# Patient Record
Sex: MALE | Race: BLACK OR AFRICAN AMERICAN | NOT HISPANIC OR LATINO | Employment: FULL TIME | URBAN - METROPOLITAN AREA
[De-identification: names, ages, dates, MRNs, and addresses within clinical notes are randomized per-mention and may not be internally consistent; named-entity substitution may affect disease eponyms.]

---

## 2017-09-15 ENCOUNTER — HOSPITAL ENCOUNTER (OUTPATIENT)
Dept: CARDIOLOGY | Facility: HOSPITAL | Age: 47
Discharge: HOME | End: 2017-09-15
Attending: INTERNAL MEDICINE
Payer: COMMERCIAL

## 2017-09-15 DIAGNOSIS — R07.9 CHEST PAIN: ICD-10-CM

## 2017-09-15 LAB
MITRAL VALVE MOBILITY: NORMAL
RETIRED EF AND QEF - SEE NOTES: 55 (ref 55–65)

## 2017-09-15 PROCEDURE — 93321 DOPPLER ECHO F-UP/LMTD STD: CPT | Mod: 26,,, | Performed by: INTERNAL MEDICINE

## 2017-09-15 PROCEDURE — 93351 STRESS TTE COMPLETE: CPT | Mod: 26,,, | Performed by: INTERNAL MEDICINE

## 2017-09-15 PROCEDURE — 93351 STRESS TTE COMPLETE: CPT | Mod: PO

## 2020-05-21 ENCOUNTER — HOSPITAL ENCOUNTER (OUTPATIENT)
Dept: RADIOLOGY | Facility: HOSPITAL | Age: 50
Discharge: HOME | End: 2020-05-21
Attending: INTERNAL MEDICINE
Payer: COMMERCIAL

## 2020-05-21 DIAGNOSIS — J18.9 PNEUMONIA: ICD-10-CM

## 2020-05-21 PROCEDURE — 71046 X-RAY EXAM CHEST 2 VIEWS: CPT | Mod: TC,FY,PO

## 2021-04-14 ENCOUNTER — HOSPITAL ENCOUNTER (EMERGENCY)
Facility: HOSPITAL | Age: 51
Discharge: HOME | End: 2021-04-14
Attending: EMERGENCY MEDICINE
Payer: MEDICAID

## 2021-04-14 VITALS
OXYGEN SATURATION: 96 % | WEIGHT: 180 LBS | TEMPERATURE: 98 F | RESPIRATION RATE: 18 BRPM | HEIGHT: 64 IN | HEART RATE: 66 BPM | DIASTOLIC BLOOD PRESSURE: 98 MMHG | BODY MASS INDEX: 30.73 KG/M2 | SYSTOLIC BLOOD PRESSURE: 146 MMHG

## 2021-04-14 DIAGNOSIS — M70.41 PREPATELLAR BURSITIS OF RIGHT KNEE: Primary | ICD-10-CM

## 2021-04-14 DIAGNOSIS — R60.9 SWELLING: ICD-10-CM

## 2021-04-14 DIAGNOSIS — I10 POORLY-CONTROLLED HYPERTENSION: ICD-10-CM

## 2021-04-14 DIAGNOSIS — L03.115 CELLULITIS OF RIGHT KNEE: ICD-10-CM

## 2021-04-14 PROCEDURE — 96372 THER/PROPH/DIAG INJ SC/IM: CPT | Mod: ER

## 2021-04-14 PROCEDURE — 99284 EMERGENCY DEPT VISIT MOD MDM: CPT | Mod: 25,ER

## 2021-04-14 PROCEDURE — 25000003 PHARM REV CODE 250: Mod: ER | Performed by: EMERGENCY MEDICINE

## 2021-04-14 PROCEDURE — 63600175 PHARM REV CODE 636 W HCPCS: Mod: ER | Performed by: EMERGENCY MEDICINE

## 2021-04-14 RX ORDER — DICLOFENAC SODIUM 25 MG/1
25 TABLET, DELAYED RELEASE ORAL 3 TIMES DAILY PRN
Qty: 9 TABLET | Refills: 0 | Status: SHIPPED | OUTPATIENT
Start: 2021-04-14 | End: 2021-04-17

## 2021-04-14 RX ORDER — CLINDAMYCIN HYDROCHLORIDE 150 MG/1
300 CAPSULE ORAL
Status: COMPLETED | OUTPATIENT
Start: 2021-04-14 | End: 2021-04-14

## 2021-04-14 RX ORDER — KETOROLAC TROMETHAMINE 30 MG/ML
60 INJECTION, SOLUTION INTRAMUSCULAR; INTRAVENOUS
Status: COMPLETED | OUTPATIENT
Start: 2021-04-14 | End: 2021-04-14

## 2021-04-14 RX ORDER — CLINDAMYCIN HYDROCHLORIDE 150 MG/1
300 CAPSULE ORAL 4 TIMES DAILY
Qty: 56 CAPSULE | Refills: 0 | Status: SHIPPED | OUTPATIENT
Start: 2021-04-14 | End: 2021-04-21

## 2021-04-14 RX ADMIN — KETOROLAC TROMETHAMINE 60 MG: 30 INJECTION, SOLUTION INTRAMUSCULAR at 06:04

## 2021-04-14 RX ADMIN — CLINDAMYCIN HYDROCHLORIDE 300 MG: 150 CAPSULE ORAL at 07:04

## 2021-05-08 ENCOUNTER — HOSPITAL ENCOUNTER (EMERGENCY)
Facility: HOSPITAL | Age: 51
Discharge: HOME | End: 2021-05-08
Attending: EMERGENCY MEDICINE
Payer: MEDICAID

## 2021-05-08 VITALS
WEIGHT: 186 LBS | RESPIRATION RATE: 18 BRPM | DIASTOLIC BLOOD PRESSURE: 83 MMHG | HEART RATE: 76 BPM | SYSTOLIC BLOOD PRESSURE: 155 MMHG | BODY MASS INDEX: 31.93 KG/M2 | TEMPERATURE: 98 F | OXYGEN SATURATION: 97 %

## 2021-05-08 DIAGNOSIS — E11.65 UNCONTROLLED TYPE 2 DIABETES MELLITUS WITH HYPERGLYCEMIA: ICD-10-CM

## 2021-05-08 DIAGNOSIS — R42 DIZZINESS: Primary | ICD-10-CM

## 2021-05-08 LAB
ALBUMIN SERPL BCP-MCNC: 4.4 G/DL (ref 3.5–5.2)
ALP SERPL-CCNC: 105 U/L (ref 38–126)
ALT SERPL W/O P-5'-P-CCNC: 47 U/L (ref 10–44)
ANION GAP SERPL CALC-SCNC: 11 MMOL/L (ref 8–16)
AST SERPL-CCNC: 39 U/L (ref 15–46)
BACTERIA #/AREA URNS AUTO: NORMAL /HPF
BASOPHILS # BLD AUTO: 0.03 K/UL (ref 0–0.2)
BASOPHILS NFR BLD: 0.3 % (ref 0–1.9)
BILIRUB SERPL-MCNC: 1 MG/DL (ref 0.1–1)
BILIRUB UR QL STRIP: NEGATIVE
CALCIUM SERPL-MCNC: 9.6 MG/DL (ref 8.7–10.5)
CHLORIDE SERPL-SCNC: 101 MMOL/L (ref 95–110)
CLARITY UR REFRACT.AUTO: CLEAR
CO2 SERPL-SCNC: 26 MMOL/L (ref 23–29)
COLOR UR AUTO: YELLOW
CREAT SERPL-MCNC: 1.22 MG/DL (ref 0.5–1.4)
DIFFERENTIAL METHOD: NORMAL
EOSINOPHIL # BLD AUTO: 0.1 K/UL (ref 0–0.5)
EOSINOPHIL NFR BLD: 1.4 % (ref 0–8)
ERYTHROCYTE [DISTWIDTH] IN BLOOD BY AUTOMATED COUNT: 12.5 % (ref 11.5–14.5)
EST. GFR  (AFRICAN AMERICAN): >60 ML/MIN/1.73 M^2
EST. GFR  (NON AFRICAN AMERICAN): >60 ML/MIN/1.73 M^2
GLUCOSE SERPL-MCNC: 328 MG/DL (ref 70–110)
GLUCOSE UR QL STRIP: ABNORMAL
HCT VFR BLD AUTO: 49.4 % (ref 40–54)
HGB BLD-MCNC: 16.7 G/DL (ref 14–18)
HGB UR QL STRIP: NEGATIVE
IMM GRANULOCYTES # BLD AUTO: 0.02 K/UL (ref 0–0.04)
IMM GRANULOCYTES NFR BLD AUTO: 0.2 % (ref 0–0.5)
KETONES UR QL STRIP: NEGATIVE
LEUKOCYTE ESTERASE UR QL STRIP: NEGATIVE
LYMPHOCYTES # BLD AUTO: 3.6 K/UL (ref 1–4.8)
LYMPHOCYTES NFR BLD: 41 % (ref 18–48)
MCH RBC QN AUTO: 30.3 PG (ref 27–31)
MCHC RBC AUTO-ENTMCNC: 33.8 G/DL (ref 32–36)
MCV RBC AUTO: 90 FL (ref 82–98)
MICROSCOPIC COMMENT: NORMAL
MONOCYTES # BLD AUTO: 0.5 K/UL (ref 0.3–1)
MONOCYTES NFR BLD: 6.2 % (ref 4–15)
NEUTROPHILS # BLD AUTO: 4.4 K/UL (ref 1.8–7.7)
NEUTROPHILS NFR BLD: 50.9 % (ref 38–73)
NITRITE UR QL STRIP: NEGATIVE
NRBC BLD-RTO: 0 /100 WBC
PH UR STRIP: 5 [PH] (ref 5–8)
PLATELET # BLD AUTO: 222 K/UL (ref 150–450)
PMV BLD AUTO: 12.2 FL (ref 9.2–12.9)
POCT GLUCOSE: 254 MG/DL (ref 70–110)
POCT GLUCOSE: 366 MG/DL (ref 70–110)
POTASSIUM SERPL-SCNC: 4.5 MMOL/L (ref 3.5–5.1)
PROT SERPL-MCNC: 8.3 G/DL (ref 6–8.4)
PROT UR QL STRIP: NEGATIVE
RBC # BLD AUTO: 5.51 M/UL (ref 4.6–6.2)
SODIUM SERPL-SCNC: 138 MMOL/L (ref 136–145)
SP GR UR STRIP: 1.02 (ref 1–1.03)
URN SPEC COLLECT METH UR: ABNORMAL
UROBILINOGEN UR STRIP-ACNC: NEGATIVE EU/DL
UUN UR-MCNC: 16 MG/DL (ref 2–20)
WBC # BLD AUTO: 8.65 K/UL (ref 3.9–12.7)
YEAST UR QL AUTO: NORMAL

## 2021-05-08 PROCEDURE — 96360 HYDRATION IV INFUSION INIT: CPT | Mod: ER

## 2021-05-08 PROCEDURE — 82962 GLUCOSE BLOOD TEST: CPT | Mod: ER

## 2021-05-08 PROCEDURE — 93010 ELECTROCARDIOGRAM REPORT: CPT | Mod: ,,, | Performed by: INTERNAL MEDICINE

## 2021-05-08 PROCEDURE — 93010 EKG 12-LEAD: ICD-10-PCS | Mod: ,,, | Performed by: INTERNAL MEDICINE

## 2021-05-08 PROCEDURE — 80053 COMPREHEN METABOLIC PANEL: CPT | Mod: ER | Performed by: PHYSICIAN ASSISTANT

## 2021-05-08 PROCEDURE — 81000 URINALYSIS NONAUTO W/SCOPE: CPT | Mod: ER | Performed by: PHYSICIAN ASSISTANT

## 2021-05-08 PROCEDURE — 25000003 PHARM REV CODE 250: Mod: ER | Performed by: PHYSICIAN ASSISTANT

## 2021-05-08 PROCEDURE — 85025 COMPLETE CBC W/AUTO DIFF WBC: CPT | Mod: ER | Performed by: PHYSICIAN ASSISTANT

## 2021-05-08 PROCEDURE — 93005 ELECTROCARDIOGRAM TRACING: CPT | Mod: ER

## 2021-05-08 PROCEDURE — 99284 EMERGENCY DEPT VISIT MOD MDM: CPT | Mod: 25,ER

## 2021-05-08 RX ORDER — GLIMEPIRIDE 1 MG/1
1 TABLET ORAL
Qty: 90 TABLET | Refills: 0 | Status: SHIPPED | OUTPATIENT
Start: 2021-05-08 | End: 2022-05-08

## 2021-05-08 RX ADMIN — SODIUM CHLORIDE 2000 ML: 0.9 INJECTION, SOLUTION INTRAVENOUS at 05:05

## 2021-10-19 LAB — CRC RECOMMENDATION EXT: NORMAL

## 2021-11-06 ENCOUNTER — HOSPITAL ENCOUNTER (EMERGENCY)
Facility: HOSPITAL | Age: 51
Discharge: HOME | End: 2021-11-06
Attending: EMERGENCY MEDICINE
Payer: COMMERCIAL

## 2021-11-06 VITALS
SYSTOLIC BLOOD PRESSURE: 124 MMHG | RESPIRATION RATE: 20 BRPM | DIASTOLIC BLOOD PRESSURE: 82 MMHG | WEIGHT: 183 LBS | HEART RATE: 74 BPM | BODY MASS INDEX: 31.41 KG/M2 | TEMPERATURE: 98 F | OXYGEN SATURATION: 99 %

## 2021-11-06 DIAGNOSIS — R07.9 CHEST PAIN: ICD-10-CM

## 2021-11-06 DIAGNOSIS — R07.9 CHEST PAIN, UNSPECIFIED TYPE: Primary | ICD-10-CM

## 2021-11-06 LAB
ALBUMIN SERPL BCP-MCNC: 4 G/DL (ref 3.5–5.2)
ALP SERPL-CCNC: 70 U/L (ref 38–126)
ALT SERPL W/O P-5'-P-CCNC: 36 U/L (ref 10–44)
ANION GAP SERPL CALC-SCNC: 8 MMOL/L (ref 8–16)
AST SERPL-CCNC: 31 U/L (ref 15–46)
BASOPHILS # BLD AUTO: 0.02 K/UL (ref 0–0.2)
BASOPHILS NFR BLD: 0.3 % (ref 0–1.9)
BILIRUB SERPL-MCNC: 0.8 MG/DL (ref 0.1–1)
CALCIUM SERPL-MCNC: 9.3 MG/DL (ref 8.7–10.5)
CHLORIDE SERPL-SCNC: 106 MMOL/L (ref 95–110)
CO2 SERPL-SCNC: 28 MMOL/L (ref 23–29)
CREAT SERPL-MCNC: 1.03 MG/DL (ref 0.5–1.4)
DIFFERENTIAL METHOD: ABNORMAL
EOSINOPHIL # BLD AUTO: 0.1 K/UL (ref 0–0.5)
EOSINOPHIL NFR BLD: 1.8 % (ref 0–8)
ERYTHROCYTE [DISTWIDTH] IN BLOOD BY AUTOMATED COUNT: 12.6 % (ref 11.5–14.5)
EST. GFR  (AFRICAN AMERICAN): >60 ML/MIN/1.73 M^2
EST. GFR  (NON AFRICAN AMERICAN): >60 ML/MIN/1.73 M^2
GLUCOSE SERPL-MCNC: 106 MG/DL (ref 70–110)
HCT VFR BLD AUTO: 45.1 % (ref 40–54)
HGB BLD-MCNC: 15.6 G/DL (ref 14–18)
IMM GRANULOCYTES # BLD AUTO: 0.01 K/UL (ref 0–0.04)
IMM GRANULOCYTES NFR BLD AUTO: 0.2 % (ref 0–0.5)
LYMPHOCYTES # BLD AUTO: 2.7 K/UL (ref 1–4.8)
LYMPHOCYTES NFR BLD: 43.3 % (ref 18–48)
MCH RBC QN AUTO: 31.2 PG (ref 27–31)
MCHC RBC AUTO-ENTMCNC: 34.6 G/DL (ref 32–36)
MCV RBC AUTO: 90 FL (ref 82–98)
MONOCYTES # BLD AUTO: 0.5 K/UL (ref 0.3–1)
MONOCYTES NFR BLD: 7.8 % (ref 4–15)
NEUTROPHILS # BLD AUTO: 2.9 K/UL (ref 1.8–7.7)
NEUTROPHILS NFR BLD: 46.6 % (ref 38–73)
NRBC BLD-RTO: 0 /100 WBC
PLATELET # BLD AUTO: 199 K/UL (ref 150–450)
PMV BLD AUTO: 11.3 FL (ref 9.2–12.9)
POTASSIUM SERPL-SCNC: 3.7 MMOL/L (ref 3.5–5.1)
PROT SERPL-MCNC: 7.2 G/DL (ref 6–8.4)
RBC # BLD AUTO: 5 M/UL (ref 4.6–6.2)
SODIUM SERPL-SCNC: 142 MMOL/L (ref 136–145)
TROPONIN I SERPL-MCNC: <0.012 NG/ML (ref 0.01–0.03)
TROPONIN I SERPL-MCNC: <0.012 NG/ML (ref 0.01–0.03)
UUN UR-MCNC: 14 MG/DL (ref 2–20)
WBC # BLD AUTO: 6.15 K/UL (ref 3.9–12.7)

## 2021-11-06 PROCEDURE — 93010 EKG 12-LEAD: ICD-10-PCS | Mod: ,,, | Performed by: INTERNAL MEDICINE

## 2021-11-06 PROCEDURE — 25000003 PHARM REV CODE 250: Mod: ER | Performed by: EMERGENCY MEDICINE

## 2021-11-06 PROCEDURE — 93010 ELECTROCARDIOGRAM REPORT: CPT | Mod: ,,, | Performed by: INTERNAL MEDICINE

## 2021-11-06 PROCEDURE — 99284 EMERGENCY DEPT VISIT MOD MDM: CPT | Mod: 25,ER

## 2021-11-06 PROCEDURE — 94760 N-INVAS EAR/PLS OXIMETRY 1: CPT | Mod: ER

## 2021-11-06 PROCEDURE — 84484 ASSAY OF TROPONIN QUANT: CPT | Mod: ER | Performed by: EMERGENCY MEDICINE

## 2021-11-06 PROCEDURE — 85025 COMPLETE CBC W/AUTO DIFF WBC: CPT | Mod: ER | Performed by: EMERGENCY MEDICINE

## 2021-11-06 PROCEDURE — 80053 COMPREHEN METABOLIC PANEL: CPT | Mod: ER | Performed by: EMERGENCY MEDICINE

## 2021-11-06 PROCEDURE — 93005 ELECTROCARDIOGRAM TRACING: CPT | Mod: ER

## 2021-11-06 RX ORDER — ASPIRIN 81 MG/1
162 TABLET ORAL
Status: COMPLETED | OUTPATIENT
Start: 2021-11-06 | End: 2021-11-06

## 2021-11-06 RX ADMIN — ASPIRIN 162 MG: 81 TABLET, COATED ORAL at 02:11

## 2021-11-15 ENCOUNTER — LAB VISIT (OUTPATIENT)
Dept: LAB | Facility: HOSPITAL | Age: 51
End: 2021-11-15
Attending: NURSE PRACTITIONER
Payer: MEDICAID

## 2021-11-15 DIAGNOSIS — E78.5 HYPERLIPIDEMIA, UNSPECIFIED: ICD-10-CM

## 2021-11-15 DIAGNOSIS — E11.65 TYPE 2 DIABETES MELLITUS WITH HYPERGLYCEMIA: Primary | ICD-10-CM

## 2021-11-15 LAB
ANION GAP SERPL CALC-SCNC: 7 MMOL/L (ref 8–16)
CALCIUM SERPL-MCNC: 8.7 MG/DL (ref 8.7–10.5)
CHLORIDE SERPL-SCNC: 107 MMOL/L (ref 95–110)
CHOLEST SERPL-MCNC: 213 MG/DL (ref 120–199)
CHOLEST/HDLC SERPL: 5.5 {RATIO} (ref 2–5)
CO2 SERPL-SCNC: 29 MMOL/L (ref 23–29)
CREAT SERPL-MCNC: 1.19 MG/DL (ref 0.5–1.4)
EST. GFR  (AFRICAN AMERICAN): >60 ML/MIN/1.73 M^2
EST. GFR  (NON AFRICAN AMERICAN): >60 ML/MIN/1.73 M^2
ESTIMATED AVG GLUCOSE: 128 MG/DL (ref 68–131)
GLUCOSE SERPL-MCNC: 148 MG/DL (ref 70–110)
HBA1C MFR BLD: 6.1 % (ref 4–5.6)
HDLC SERPL-MCNC: 39 MG/DL (ref 40–75)
HDLC SERPL: 18.3 % (ref 20–50)
LDLC SERPL CALC-MCNC: 144.4 MG/DL (ref 63–159)
NONHDLC SERPL-MCNC: 174 MG/DL
POTASSIUM SERPL-SCNC: 4.3 MMOL/L (ref 3.5–5.1)
SODIUM SERPL-SCNC: 143 MMOL/L (ref 136–145)
TRIGL SERPL-MCNC: 148 MG/DL (ref 30–150)
UUN UR-MCNC: 17 MG/DL (ref 2–20)

## 2021-11-15 PROCEDURE — 80061 LIPID PANEL: CPT | Performed by: NURSE PRACTITIONER

## 2021-11-15 PROCEDURE — 80048 BASIC METABOLIC PNL TOTAL CA: CPT | Mod: PO | Performed by: NURSE PRACTITIONER

## 2021-11-15 PROCEDURE — 36415 COLL VENOUS BLD VENIPUNCTURE: CPT | Mod: PO | Performed by: NURSE PRACTITIONER

## 2021-11-15 PROCEDURE — 83036 HEMOGLOBIN GLYCOSYLATED A1C: CPT | Performed by: NURSE PRACTITIONER

## 2023-03-28 ENCOUNTER — OCCUPATIONAL HEALTH (OUTPATIENT)
Dept: URGENT CARE | Facility: CLINIC | Age: 53
End: 2023-03-28

## 2023-03-28 DIAGNOSIS — Z00.00 ENCOUNTER FOR PHYSICAL EXAMINATION: Primary | ICD-10-CM

## 2023-03-28 LAB
CTP QC/QA: YES
GLUCOSE SERPL-MCNC: 184 MG/DL (ref 70–110)
POC 10 PANEL DRUG SCREEN: NEGATIVE

## 2023-03-28 PROCEDURE — 82962 POCT GLUCOSE, HAND-HELD DEVICE: ICD-10-PCS | Mod: S$GLB,,, | Performed by: PHYSICIAN ASSISTANT

## 2023-03-28 PROCEDURE — 99499 UNLISTED E&M SERVICE: CPT | Mod: S$GLB,,, | Performed by: PHYSICIAN ASSISTANT

## 2023-03-28 PROCEDURE — 97750 PHYSICAL PERFORMANCE TEST: CPT | Mod: S$GLB,,,

## 2023-03-28 PROCEDURE — 80305 DRUG TEST PRSMV DIR OPT OBS: CPT | Mod: S$GLB,,, | Performed by: PHYSICIAN ASSISTANT

## 2023-03-28 PROCEDURE — 99499 PHYSICAL, BASIC COMPLEXITY: ICD-10-PCS | Mod: S$GLB,,, | Performed by: PHYSICIAN ASSISTANT

## 2023-03-28 PROCEDURE — 99080 OSHA QUESTIONNAIRE: ICD-10-PCS | Mod: S$GLB,,, | Performed by: PHYSICIAN ASSISTANT

## 2023-03-28 PROCEDURE — 80305 POCT RAPID DRUG SCREEN 10 PANEL: ICD-10-PCS | Mod: S$GLB,,, | Performed by: PHYSICIAN ASSISTANT

## 2023-03-28 PROCEDURE — 82962 GLUCOSE BLOOD TEST: CPT | Mod: S$GLB,,, | Performed by: PHYSICIAN ASSISTANT

## 2023-03-28 PROCEDURE — 97750 AGILITY TEST: ICD-10-PCS | Mod: S$GLB,,,

## 2023-03-28 PROCEDURE — 94010 BREATHING CAPACITY TEST: CPT | Mod: S$GLB,,, | Performed by: PHYSICIAN ASSISTANT

## 2023-03-28 PROCEDURE — 99080 SPECIAL REPORTS OR FORMS: CPT | Mod: S$GLB,,, | Performed by: PHYSICIAN ASSISTANT

## 2023-03-28 PROCEDURE — 94010 PULMONARY FUNCTION SCREENING (OCC MED PHYSICALS): ICD-10-PCS | Mod: S$GLB,,, | Performed by: PHYSICIAN ASSISTANT

## 2023-08-02 ENCOUNTER — HOSPITAL ENCOUNTER (EMERGENCY)
Facility: HOSPITAL | Age: 53
Discharge: HOME | End: 2023-08-02
Attending: EMERGENCY MEDICINE
Payer: MEDICAID

## 2023-08-02 VITALS
DIASTOLIC BLOOD PRESSURE: 98 MMHG | RESPIRATION RATE: 20 BRPM | WEIGHT: 190 LBS | OXYGEN SATURATION: 97 % | TEMPERATURE: 99 F | SYSTOLIC BLOOD PRESSURE: 171 MMHG | HEIGHT: 64 IN | HEART RATE: 80 BPM | BODY MASS INDEX: 32.44 KG/M2

## 2023-08-02 DIAGNOSIS — U07.1 COVID-19 VIRUS INFECTION: Primary | ICD-10-CM

## 2023-08-02 DIAGNOSIS — U07.1 COVID-19 VIRUS DETECTED: ICD-10-CM

## 2023-08-02 DIAGNOSIS — J06.9 VIRAL URI WITH COUGH: ICD-10-CM

## 2023-08-02 LAB — SARS-COV-2 RDRP RESP QL NAA+PROBE: POSITIVE

## 2023-08-02 PROCEDURE — 99282 EMERGENCY DEPT VISIT SF MDM: CPT | Mod: ER

## 2023-08-02 PROCEDURE — U0002 COVID-19 LAB TEST NON-CDC: HCPCS | Mod: ER | Performed by: EMERGENCY MEDICINE

## 2023-08-02 NOTE — Clinical Note
"Joe"Song Avila was seen and treated in our emergency department on 8/2/2023.     COVID-19 is present in our communities across the state. There is limited testing for COVID at this time, so not all patients can be tested. In this situation, your employee meets the following criteria:    Joe Avila has met the criteria for COVID-19 testing and has a POSITIVE result. He can return to work once they are asymptomatic for 24 hours without the use of fever reducing medications AND at least five days from the first positive result. A mask is recommended for 5 days post quarantine.     If you have any questions or concerns, or if I can be of further assistance, please do not hesitate to contact me.    Sincerely,             Michael Alvarez MD"

## 2023-08-02 NOTE — DISCHARGE INSTRUCTIONS
You were evaluated today for COVID symptoms.  Your COVID test was positive.  At this time, we recommend social isolation/home quarantine to prevent spread and transfer of the viral illness.    Please continue infection control precautions at home, like covering your mouth when coughing, washing your hands frequently, and minimizing contact with others as much as possible.     We recommend that you stay home in quarantine as long as you are having symptoms.    You may continue to have viral symptoms, including fever/chills, body aches, cough, nausea/vomiting/diarrhea, and fatigue. These symptoms are usually best treated at home with NSAIDs (Tylenol/Motrin), cough medication, lots of fluids, and rest.    Your symptoms may get worse at any time. If that is the case, particularly if you are having shortness of breath or difficulty breathing, please contact your doctor, the telemedicine program, or return to the ER for re-evaluation.  If you are able, purchase an oxygen saturation monitor, and measure your oxygen level a few times per day while you are ill at home. Return to the ER if your oxygen level falls below 90%.    Thank you for your understanding and patience with us during this global health crisis.  We hope you feel better soon.

## 2023-08-02 NOTE — Clinical Note
"Joe"Song Avila was seen and treated in our emergency department on 8/2/2023.     COVID-19 is present in our communities across the state. There is limited testing for COVID at this time, so not all patients can be tested. In this situation, your employee meets the following criteria:    Jeo Avila has met the criteria for COVID-19 testing and has a POSITIVE result. He can return to work once they are asymptomatic for 24 hours without the use of fever reducing medications AND at least five days from the first positive result. A mask is recommended for 5 days post quarantine.     If you have any questions or concerns, or if I can be of further assistance, please do not hesitate to contact me.    Sincerely,             Michael Alvarez MD"

## 2023-08-02 NOTE — ED PROVIDER NOTES
Emergency Department Encounter  Provider Note    Joe Avila  [id number]  2023    Evaluation:    History:     Chief Complaint   Patient presents with    COVID-19 Concerns     Pt reports positive covid test at home. States he wanted to be re checked.       History of Present Illness:  Joe Avila is a 53 y.o. male who has a past medical history of Arthritis, Diabetes mellitus, Hypercholesteremia, and Hypertension.    The patient presents to the ED due to cough and concern for COVID.   Patient reports symptoms started about a week ago.   He went to a family member's  and was in close contact with multiple people who traveled from out of town.  He is unaware of any known sick contacts.  He performed a home COVID test that was positive yesterday.  His wife tested negative, though she does not have any symptoms.  He has been taking NyQuil at home.  He takes medication for HTN and diabetes.  Denies any chest pain, fever, abdominal pain, vomiting/diarrhea, or any other concerns.  He is mainly here to get a confirmatory test for his employer.      Past Medical History:   Diagnosis Date    Arthritis     Diabetes mellitus     Hypercholesteremia     Hypertension      Past Surgical History:   Procedure Laterality Date    EYE SURGERY       History reviewed. No pertinent family history.  Social History     Socioeconomic History    Marital status:    Tobacco Use    Smoking status: Never    Smokeless tobacco: Never   Substance and Sexual Activity    Alcohol use: Not Currently     Alcohol/week: 3.0 standard drinks of alcohol     Types: 3 Glasses of wine per week     Comment: wine    Drug use: Yes     Types: Marijuana     Comment: weekly     Review of patient's allergies indicates:   Allergen Reactions    Iodinated contrast media Itching       Review of Systems   Respiratory:  Positive for cough.        Physical Exam:     Initial Vitals [23 0939]   BP Pulse Resp Temp SpO2   (!) 171/98 80 20 98.6 °F (37 °C)  97 %      MAP       --         Physical Exam    Nursing note and vitals reviewed.  Constitutional: He appears well-developed and well-nourished. He is not diaphoretic. No distress.   Well-appearing, in no distress.    HENT:   Head: Normocephalic and atraumatic.   Mouth/Throat: Oropharynx is clear and moist.   Eyes: EOM are normal. Pupils are equal, round, and reactive to light.   Neck: No tracheal deviation present.   Cardiovascular:  Normal rate, regular rhythm, normal heart sounds and intact distal pulses.           Pulmonary/Chest: Breath sounds normal. No stridor. No respiratory distress.   Abdominal: Abdomen is soft. He exhibits no distension and no mass. There is no abdominal tenderness.   Musculoskeletal:         General: No edema. Normal range of motion.     Neurological: He is alert and oriented to person, place, and time. No cranial nerve deficit or sensory deficit.   Skin: Skin is warm and dry. Capillary refill takes less than 2 seconds. No rash noted.   Psychiatric: He has a normal mood and affect. His behavior is normal. Thought content normal.         ED Course:   Procedures    Medical Decision Making:    History Acquisition:   Additional historians utilized:  none    Prior medical records were reviewed:   ED visit 11/2021 for chest pain.    The patient's list of active medical problems, social history, medications, and allergies as documented has been reviewed.     Differential Diagnoses:   Based on available information and initial assessment, Differential Diagnosis includes, but is not limited to:  COVID-19 infection, influenza, bacterial sinusitis, allergic rhinitis, bacterial/viral pharyngitis, peritonsillar abscess, retropharyngeal abscess, bacterial/viral pneumonia.        EKG:       Labs:     Labs Reviewed   SARS-COV-2 RNA AMPLIFICATION, QUAL - Abnormal; Notable for the following components:       Result Value    SARS-CoV-2 RNA, Amplification, Qual Positive (*)     All other components within  normal limits    Narrative:     Is the patient symptomatic?->Yes     Independent review of the labs ordered include:   See ED course    Imaging:     Imaging Results    None            Additional Consideration:   Additional testing considered during clinical course: none    Social determinants of health considered during development of treatment plan include: poor access to care    Current co-morbidities considered which impacted clinical decision making: HTN, DM, arthritis     Case discussed with additional provider: none    Medications - No data to display     ED Course as of 08/02/23 1019   Wed Aug 02, 2023   0949 SpO2: 97 % [SS]   0949 Resp: 20 [SS]   0949 Pulse: 80 [SS]   0949 Temp Source: Oral [SS]   0949 Temp: 98.6 °F (37 °C) [SS]   0949 BP(!): 171/98  Vitals reassuring, pulse ox normal.  [SS]   1015 COVID-19 Rapid Screening(!)  COVID +.  Patient's symptoms have been present for the last week. No indication for Paxlovid at this time. Recommend home quarantine and supportive care. Work note provided.  [SS]      ED Course User Index  [SS] Michael Alvarez MD       Medical Decision Making:   Initial Assessment:   54 yo M presents for confirmation of positive home COVID test.  Vitals and exam benign.  Will repeat COVID test and reassess.  Clinical Tests:   Lab Tests: Ordered and Reviewed  ED Management:  After complete evaluation, including thorough history and physical exam, patient's symptoms and presentation is most consistent with viral respiratory illness due to COVID-19.    Rapid COVID test +.    Patient monitored in the ED and has remained stable on room air.  Vital signs do not indicate sepsis/severe infection, hypoxia, tachypnea, or respiratory distress, and in my professional opinion the benefits of hospital admission/observation DO NOT outweigh the risks of hospital-associated exposures, and the patient is well enough for discharge home with supportive care. The patient was counseled appropriately and  provided with instructions for social isolation/home quarantine, hand washing, and symptomatic treatment. The patient was also given a work excuse, if needed. ED return precautions, including worsening symptoms or severe dyspnea, were discussed with the patient, who expressed understanding and is comfortable with plan at this time.        On re-evaluation, the patient's status has improved.  After complete ED evaluation, clinical impression is most consistent with COVID infection.  PCP follow-up as soon as possible was recommended.    After taking into careful account the patient's history, physical exam findings, as well as empirical and objective data obtained throughout ED workup, I feel no emergent medical condition has been identified. No further evaluation or admission was felt to be required, and the patient is stable for discharge from the ED. The patient and any additional family present were updated with test results, overall clinical impression, and recommended further plan of care, including discharge instructions as provided and outpatient follow-up for continued evaluation and management as needed. All questions were answered. The patient expressed understanding and agreed with current plan for discharge and follow-up plan of care. Strict ED return precautions were provided, including return/worsening of current symptoms, new symptoms, or any other concerns.                 Clinical Impression:       ICD-10-CM ICD-9-CM   1. COVID-19 virus infection  U07.1 079.89   2. Viral URI with cough  J06.9 465.9         Follow-up Information       Follow up With Specialties Details Why Contact Info    Jose Jennings MD Endocrinology Schedule an appointment as soon as possible for a visit   [address] [address]  [phone]               ED Disposition Condition    Discharge Stable               Michael Alvarez MD  08/02/23 1024

## 2024-01-19 ENCOUNTER — CLINICAL SUPPORT (OUTPATIENT)
Dept: OTHER | Facility: CLINIC | Age: 54
End: 2024-01-19

## 2024-01-19 DIAGNOSIS — Z00.8 ENCOUNTER FOR OTHER GENERAL EXAMINATION: ICD-10-CM

## 2024-01-23 VITALS
WEIGHT: 187 LBS | DIASTOLIC BLOOD PRESSURE: 100 MMHG | BODY MASS INDEX: 31.92 KG/M2 | SYSTOLIC BLOOD PRESSURE: 152 MMHG | HEIGHT: 64 IN

## 2024-01-23 LAB
HBA1C MFR BLD: 6.9 %
HDLC SERPL-MCNC: 22 MG/DL
POC CHOLESTEROL, LDL (DOCK): 168 MG/DL
POC CHOLESTEROL, TOTAL: 213 MG/DL
POC GLUCOSE, FASTING: 124 MG/DL (ref 60–110)
TRIGL SERPL-MCNC: 125 MG/DL

## 2024-01-25 ENCOUNTER — TELEPHONE (OUTPATIENT)
Dept: OTHER | Facility: CLINIC | Age: 54
End: 2024-01-25
Payer: MEDICAID

## 2024-08-26 ENCOUNTER — OFFICE VISIT (OUTPATIENT)
Dept: UROLOGY | Facility: CLINIC | Age: 54
End: 2024-08-26
Payer: COMMERCIAL

## 2024-08-26 VITALS
WEIGHT: 183 LBS | HEART RATE: 103 BPM | HEIGHT: 64 IN | BODY MASS INDEX: 31.24 KG/M2 | DIASTOLIC BLOOD PRESSURE: 95 MMHG | SYSTOLIC BLOOD PRESSURE: 140 MMHG

## 2024-08-26 DIAGNOSIS — E11.8 DM TYPE 2 CAUSING COMPLICATION: ICD-10-CM

## 2024-08-26 DIAGNOSIS — N13.8 BPH WITH URINARY OBSTRUCTION: ICD-10-CM

## 2024-08-26 DIAGNOSIS — N40.1 BPH WITH URINARY OBSTRUCTION: ICD-10-CM

## 2024-08-26 DIAGNOSIS — N52.01 ERECTILE DYSFUNCTION DUE TO ARTERIAL INSUFFICIENCY: Primary | ICD-10-CM

## 2024-08-26 DIAGNOSIS — N48.6 PEYRONIE'S DISEASE: ICD-10-CM

## 2024-08-26 PROCEDURE — 3080F DIAST BP >= 90 MM HG: CPT | Mod: CPTII,S$GLB,, | Performed by: UROLOGY

## 2024-08-26 PROCEDURE — 3077F SYST BP >= 140 MM HG: CPT | Mod: CPTII,S$GLB,, | Performed by: UROLOGY

## 2024-08-26 PROCEDURE — 1160F RVW MEDS BY RX/DR IN RCRD: CPT | Mod: CPTII,S$GLB,, | Performed by: UROLOGY

## 2024-08-26 PROCEDURE — 99999 PR PBB SHADOW E&M-EST. PATIENT-LVL III: CPT | Mod: PBBFAC,,, | Performed by: UROLOGY

## 2024-08-26 PROCEDURE — 3008F BODY MASS INDEX DOCD: CPT | Mod: CPTII,S$GLB,, | Performed by: UROLOGY

## 2024-08-26 PROCEDURE — 99204 OFFICE O/P NEW MOD 45 MIN: CPT | Mod: S$GLB,,, | Performed by: UROLOGY

## 2024-08-26 PROCEDURE — 3044F HG A1C LEVEL LT 7.0%: CPT | Mod: CPTII,S$GLB,, | Performed by: UROLOGY

## 2024-08-26 PROCEDURE — 1159F MED LIST DOCD IN RCRD: CPT | Mod: CPTII,S$GLB,, | Performed by: UROLOGY

## 2024-08-26 RX ORDER — TADALAFIL 20 MG/1
20 TABLET ORAL
COMMUNITY
Start: 2024-08-16

## 2024-08-26 RX ORDER — COLCHICINE 0.6 MG/1
0.6 TABLET ORAL 2 TIMES DAILY
COMMUNITY
Start: 2024-04-24

## 2024-08-26 RX ORDER — HYDRALAZINE HYDROCHLORIDE 100 MG/1
100 TABLET, FILM COATED ORAL 2 TIMES DAILY
COMMUNITY

## 2024-08-26 RX ORDER — BLOOD SUGAR DIAGNOSTIC
STRIP MISCELLANEOUS
COMMUNITY
Start: 2024-04-04

## 2024-08-26 RX ORDER — SILDENAFIL 100 MG/1
100 TABLET, FILM COATED ORAL DAILY PRN
Qty: 10 TABLET | Refills: 11 | Status: SHIPPED | OUTPATIENT
Start: 2024-08-26 | End: 2025-08-26

## 2024-08-26 RX ORDER — PRAVASTATIN SODIUM 40 MG/1
40 TABLET ORAL NIGHTLY
COMMUNITY

## 2024-08-26 RX ORDER — LANCETS 33 GAUGE
EACH MISCELLANEOUS
COMMUNITY
Start: 2024-04-04

## 2024-08-26 RX ORDER — LANCETS 30 GAUGE
EACH MISCELLANEOUS
COMMUNITY
Start: 2024-04-04

## 2024-08-26 RX ORDER — PENTOXIFYLLINE 400 MG/1
400 TABLET, EXTENDED RELEASE ORAL
COMMUNITY
Start: 2024-07-08

## 2024-08-26 RX ORDER — EZETIMIBE 10 MG/1
10 TABLET ORAL DAILY
COMMUNITY
Start: 2024-08-16

## 2024-08-26 RX ORDER — BLOOD-GLUCOSE SENSOR
EACH MISCELLANEOUS
COMMUNITY
Start: 2024-07-23

## 2024-08-26 NOTE — PROGRESS NOTES
CHIEF COMPLAINT:    Mr. Avila is a 54 y.o. male presenting for a consultation at the request of Dr. Chandra. Patient presents with peyronie's disease.    PRESENTING ILLNESS:    Joe Avila is a 54 y.o. male who c/o peyronie's disease.  Has been present for 3 months.  He has an hour glass deformity. No curve. It is not difficult to penetrate.  Is currently on trental.    He does have ED.  He's tried and failed Cialis.    Dr. Chandra is his general urologist.    REVIEW OF SYSTEMS:    Joe Avila denies headache, blurred vision, fever, nausea, vomiting, chills, abdominal pain, chest pain, sore throat, bleeding per rectum, cough, SOB, recent loss of consciousness, recent mental status changes, seizures, dizziness, or upper or lower extremity weakness.    TAMARA  1. 3  2. 3  3. 3  4. 4  5. 2      PATIENT HISTORY:    Past Medical History:   Diagnosis Date    Arthritis     Diabetes mellitus     Hypercholesteremia     Hypertension     Male erectile dysfunction, unspecified        Past Surgical History:   Procedure Laterality Date    EYE SURGERY         No family history on file.    Social History     Socioeconomic History    Marital status:    Tobacco Use    Smoking status: Never    Smokeless tobacco: Never   Substance and Sexual Activity    Alcohol use: Not Currently     Alcohol/week: 3.0 standard drinks of alcohol     Types: 3 Glasses of wine per week     Comment: wine    Drug use: Yes     Types: Marijuana     Comment: weekly    Sexual activity: Yes     Partners: Female     Social Determinants of Health     Financial Resource Strain: Medium Risk (8/25/2024)    Overall Financial Resource Strain (CARDIA)     Difficulty of Paying Living Expenses: Somewhat hard   Food Insecurity: No Food Insecurity (8/25/2024)    Hunger Vital Sign     Worried About Running Out of Food in the Last Year: Never true     Ran Out of Food in the Last Year: Never true   Physical Activity: Insufficiently Active (8/25/2024)    Exercise Vital Sign      Days of Exercise per Week: 3 days     Minutes of Exercise per Session: 20 min   Stress: No Stress Concern Present (2024)    [employer] - Occupational Stress Questionnaire     Feeling of Stress : Only a little   Housing Stability: High Risk (2024)    Housing Stability Vital Sign     Unable to Pay for Housing in the Last Year: Yes       Allergies:  Iodinated contrast media    Medications:    Current Outpatient Medications:     colchicine (COLCRYS) 0.6 mg tablet, Take 0.6 mg by mouth 2 (two) times daily., Disp: , Rfl:     ezetimibe (ZETIA) 10 mg tablet, Take 10 mg by mouth once daily., Disp: , Rfl:     FREESTYLE [place] 3 SENSOR Lidia, , Disp: , Rfl:     hydrALAZINE (APRESOLINE) 100 MG tablet, Take 100 mg by mouth 2 (two) times a day., Disp: , Rfl:     ONETOUCH DELICA PLUS LANCET 33 gauge Misc, Apply topically., Disp: , Rfl:     ONETOUCH ULTRA TEST Strp, , Disp: , Rfl:     ONETOUCH ULTRA2 METER Misc, SMARTSI Via Meter Daily, Disp: , Rfl:     pentoxifylline (TRENTAL) 400 mg TbSR, Take 400 mg by mouth 3 (three) times daily with meals., Disp: , Rfl:     pravastatin (PRAVACHOL) 40 MG tablet, Take 40 mg by mouth nightly., Disp: , Rfl:     tadalafiL (CIALIS) 20 MG Tab, Take 20 mg by mouth as needed., Disp: , Rfl:     glimepiride (AMARYL) 1 MG tablet, Take 1 tablet (1 mg total) by mouth before breakfast., Disp: 90 tablet, Rfl: 0    sildenafiL (VIAGRA) 100 MG tablet, Take 1 tablet (100 mg total) by mouth daily as needed for Erectile Dysfunction. Dispense generic, Disp: 10 tablet, Rfl: 11    PHYSICAL EXAMINATION:    The patient generally appears in good health, is appropriately interactive, and is in no apparent distress.     Eyes: anicteric sclerae, moist conjunctivae; no lid-lag; PERRLA     HENT: Atraumatic; oropharynx clear with moist mucous membranes and no mucosal ulcerations;normal hard and soft palate.  No evidence of lymphadenopathy.    Neck: Trachea midline.  No  "thyromegaly.    Skin: No lesions.    Mental: Cooperative with normal affect.  Is oriented to time, place, and person.    Neuro: Grossly intact.    Chest: Normal inspiratory effort.   No accessory muscles.  No audible wheezes.  Respirations symmetric on inspiration and expiration.    Heart: Regular rhythm.      Abdomen:  Soft, non-tender. No masses or organomegaly. Bladder is not palpable. No evidence of flank discomfort. No evidence of inguinal hernia.    Genitourinary: The penis is circumcised with a plaque c/w peyronie's on the shaft. The urethral meatus is normal. The testes, epididymides, and cord structures are normal in size and contour bilaterally. The scrotum is normal in size and contour.    Extremities: No clubbing, cyanosis, or edema      LABS:      No results found for: "PSA", "PSADIAG", "PSATOTAL", "PSAFREE", "PSAFREEPCT"    IMPRESSION:    Encounter Diagnoses   Name Primary?    Erectile dysfunction due to arterial insufficiency Yes    Peyronie's disease     BPH with urinary obstruction     DM type 2 causing complication    DM, stable      PLAN:    1. Discussed the pathophysiology of peyronie's.  Because it is not interfering with penetration and has been present for 3 months, he can continue trental.  2. Discussed options for his ED (affected by above comorbidities).  He'd like Viagra. Side effects discussed.  A new Rx was given  3. Will observe his LUTS as they don't bother him.  4. Continue to see Dr. Chandra for general urology.  5.RTC 3 months    Copy to: Prateek      "

## 2025-04-11 ENCOUNTER — CLINICAL SUPPORT (OUTPATIENT)
Dept: OTHER | Facility: CLINIC | Age: 55
End: 2025-04-11

## 2025-04-11 DIAGNOSIS — Z00.8 ENCOUNTER FOR OTHER GENERAL EXAMINATION: ICD-10-CM

## 2025-04-12 VITALS
HEIGHT: 64 IN | WEIGHT: 177 LBS | BODY MASS INDEX: 30.22 KG/M2 | SYSTOLIC BLOOD PRESSURE: 126 MMHG | DIASTOLIC BLOOD PRESSURE: 88 MMHG

## 2025-04-12 LAB
HBA1C MFR BLD: 6.3 %
HDLC SERPL-MCNC: 25 MG/DL
POC CHOLESTEROL, LDL (DOCK): 84 MG/DL
POC CHOLESTEROL, TOTAL: 122 MG/DL
POC GLUCOSE, FASTING: 140 MG/DL (ref 60–110)
TRIGL SERPL-MCNC: 60 MG/DL

## 2025-04-15 ENCOUNTER — RESULTS FOLLOW-UP (OUTPATIENT)
Dept: OTHER | Facility: CLINIC | Age: 55
End: 2025-04-15

## 2025-04-21 ENCOUNTER — TELEPHONE (OUTPATIENT)
Dept: INTERNAL MEDICINE | Facility: CLINIC | Age: 55
End: 2025-04-21
Payer: COMMERCIAL

## 2025-04-25 ENCOUNTER — TELEPHONE (OUTPATIENT)
Dept: INTERNAL MEDICINE | Facility: CLINIC | Age: 55
End: 2025-04-25
Payer: COMMERCIAL

## 2025-04-28 ENCOUNTER — LAB VISIT (OUTPATIENT)
Dept: LAB | Facility: HOSPITAL | Age: 55
End: 2025-04-28
Attending: STUDENT IN AN ORGANIZED HEALTH CARE EDUCATION/TRAINING PROGRAM
Payer: COMMERCIAL

## 2025-04-28 ENCOUNTER — OFFICE VISIT (OUTPATIENT)
Dept: INTERNAL MEDICINE | Facility: CLINIC | Age: 55
End: 2025-04-28
Payer: COMMERCIAL

## 2025-04-28 VITALS
OXYGEN SATURATION: 98 % | DIASTOLIC BLOOD PRESSURE: 88 MMHG | HEIGHT: 64 IN | HEART RATE: 86 BPM | SYSTOLIC BLOOD PRESSURE: 146 MMHG | WEIGHT: 187.63 LBS | BODY MASS INDEX: 32.03 KG/M2

## 2025-04-28 DIAGNOSIS — M25.50 ARTHRALGIA, UNSPECIFIED JOINT: ICD-10-CM

## 2025-04-28 DIAGNOSIS — E78.00 HYPERCHOLESTEREMIA: ICD-10-CM

## 2025-04-28 DIAGNOSIS — N52.01 ERECTILE DYSFUNCTION DUE TO ARTERIAL INSUFFICIENCY: ICD-10-CM

## 2025-04-28 DIAGNOSIS — E11.9 TYPE 2 DIABETES MELLITUS WITHOUT COMPLICATION, WITHOUT LONG-TERM CURRENT USE OF INSULIN: ICD-10-CM

## 2025-04-28 DIAGNOSIS — I10 PRIMARY HYPERTENSION: ICD-10-CM

## 2025-04-28 DIAGNOSIS — Z00.00 ENCOUNTER FOR ANNUAL PHYSICAL EXAM: Primary | ICD-10-CM

## 2025-04-28 DIAGNOSIS — Z00.00 ENCOUNTER FOR ANNUAL PHYSICAL EXAM: ICD-10-CM

## 2025-04-28 LAB
EAG (OHS): 154 MG/DL (ref 68–131)
HBA1C MFR BLD: 7 % (ref 4–5.6)
HCV AB SERPL QL IA: NORMAL
HIV 1+2 AB+HIV1 P24 AG SERPL QL IA: NORMAL

## 2025-04-28 PROCEDURE — 3077F SYST BP >= 140 MM HG: CPT | Mod: CPTII,S$GLB,, | Performed by: STUDENT IN AN ORGANIZED HEALTH CARE EDUCATION/TRAINING PROGRAM

## 2025-04-28 PROCEDURE — 99386 PREV VISIT NEW AGE 40-64: CPT | Mod: 25,S$GLB,, | Performed by: STUDENT IN AN ORGANIZED HEALTH CARE EDUCATION/TRAINING PROGRAM

## 2025-04-28 PROCEDURE — 99999 PR PBB SHADOW E&M-EST. PATIENT-LVL IV: CPT | Mod: PBBFAC,,, | Performed by: STUDENT IN AN ORGANIZED HEALTH CARE EDUCATION/TRAINING PROGRAM

## 2025-04-28 PROCEDURE — 80053 COMPREHEN METABOLIC PANEL: CPT

## 2025-04-28 PROCEDURE — 3051F HG A1C>EQUAL 7.0%<8.0%: CPT | Mod: CPTII,S$GLB,, | Performed by: STUDENT IN AN ORGANIZED HEALTH CARE EDUCATION/TRAINING PROGRAM

## 2025-04-28 PROCEDURE — 90471 IMMUNIZATION ADMIN: CPT | Mod: S$GLB,,, | Performed by: STUDENT IN AN ORGANIZED HEALTH CARE EDUCATION/TRAINING PROGRAM

## 2025-04-28 PROCEDURE — 85025 COMPLETE CBC W/AUTO DIFF WBC: CPT

## 2025-04-28 PROCEDURE — 3079F DIAST BP 80-89 MM HG: CPT | Mod: CPTII,S$GLB,, | Performed by: STUDENT IN AN ORGANIZED HEALTH CARE EDUCATION/TRAINING PROGRAM

## 2025-04-28 PROCEDURE — 3008F BODY MASS INDEX DOCD: CPT | Mod: CPTII,S$GLB,, | Performed by: STUDENT IN AN ORGANIZED HEALTH CARE EDUCATION/TRAINING PROGRAM

## 2025-04-28 PROCEDURE — 87389 HIV-1 AG W/HIV-1&-2 AB AG IA: CPT

## 2025-04-28 PROCEDURE — 86803 HEPATITIS C AB TEST: CPT

## 2025-04-28 PROCEDURE — 3066F NEPHROPATHY DOC TX: CPT | Mod: CPTII,S$GLB,, | Performed by: STUDENT IN AN ORGANIZED HEALTH CARE EDUCATION/TRAINING PROGRAM

## 2025-04-28 PROCEDURE — 84550 ASSAY OF BLOOD/URIC ACID: CPT

## 2025-04-28 PROCEDURE — 1159F MED LIST DOCD IN RCRD: CPT | Mod: CPTII,S$GLB,, | Performed by: STUDENT IN AN ORGANIZED HEALTH CARE EDUCATION/TRAINING PROGRAM

## 2025-04-28 PROCEDURE — 85652 RBC SED RATE AUTOMATED: CPT

## 2025-04-28 PROCEDURE — 83036 HEMOGLOBIN GLYCOSYLATED A1C: CPT

## 2025-04-28 PROCEDURE — 36415 COLL VENOUS BLD VENIPUNCTURE: CPT

## 2025-04-28 PROCEDURE — 3061F NEG MICROALBUMINURIA REV: CPT | Mod: CPTII,S$GLB,, | Performed by: STUDENT IN AN ORGANIZED HEALTH CARE EDUCATION/TRAINING PROGRAM

## 2025-04-28 PROCEDURE — 90677 PCV20 VACCINE IM: CPT | Mod: S$GLB,,, | Performed by: STUDENT IN AN ORGANIZED HEALTH CARE EDUCATION/TRAINING PROGRAM

## 2025-04-28 PROCEDURE — 82570 ASSAY OF URINE CREATININE: CPT

## 2025-04-28 RX ORDER — GLIMEPIRIDE 1 MG/1
1 TABLET ORAL
Qty: 90 TABLET | Refills: 3 | Status: SHIPPED | OUTPATIENT
Start: 2025-04-28 | End: 2026-04-28

## 2025-04-28 RX ORDER — HYDRALAZINE HYDROCHLORIDE 100 MG/1
100 TABLET, FILM COATED ORAL 2 TIMES DAILY
Qty: 180 TABLET | Refills: 3 | Status: SHIPPED | OUTPATIENT
Start: 2025-04-28

## 2025-04-28 NOTE — PROGRESS NOTES
Patient ID: Joe Avila is a 55 y.o. male.  Chief Complaint: Establish Care (join) and Temporomandibular Joint Pain    Subjective:   History of Present Illness    CHIEF COMPLAINT:  Patient presents today for follow up    MUSCULOSKELETAL:  He has history of arthritis since childhood with episode requiring hospitalization at age 12-13. He currently experiences joint pain affecting fingers, elbows, shoulders, knees, hips, and back, rated 6-7/10 in severity. He reports morning stiffness lasting less than 30 minutes that improves with movement. He attends weekly chiropractic sessions for management. Family history notable for brother with severe debilitating arthritis affecting mobility.  Patient states that he is able to do his daily activities once he gets going.    CURRENT MEDICATIONS:  He takes glimepiride 1 mg before breakfast for diabetes, Pravastatin 40 mg and Zetia 10 mg at night for cholesterol management, and Hydralazine 100 mg twice daily for blood pressure control. Previous trial of Metformin was discontinued due to GI side effects. Lisinopril was discontinued due to persistent cough that resolved within 2 days of discontinuation.    MEDICAL HISTORY:  He has diabetes, Peyronie's disease presenting as penile curvature without pain, and contrast dye allergy manifesting as generalized itching and skin peeling after exposure.    SURGICAL HISTORY:  History of unilateral cataract surgery.    FAMILY HISTORY:  Mother  from breast cancer and had diabetes. Father  from heart attack at age 68. Sister with history of breast cancer.    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 He quit marijuana use in January of current year and alcohol consumption on Father's Day .      ROS:  General: -fever, -chills, -fatigue, -weight gain, -weight loss  Eyes: -vision  "changes, -redness, -discharge  ENT: -ear pain, -nasal congestion, -sore throat  Cardiovascular: -chest pain, -palpitations, -lower extremity edema  Respiratory: -cough, -shortness of breath  Gastrointestinal: -abdominal pain, -nausea, -vomiting, -diarrhea, -constipation, -blood in stool  Genitourinary: -dysuria, -hematuria, -frequency  Musculoskeletal: +joint pain, -muscle pain, +joint stiffness, +limb pain, +back pain, +pain with movement  Skin: -rash, -lesion  Neurological: -headache, +dizziness, -numbness, -tingling, +orthostatic symptoms  Psychiatric: -anxiety, -depression, -sleep difficulty              Labs Reviewed:  2025:  LDL 84   Objective:   BP (!) 146/88 (BP Location: Left arm, Patient Position: Sitting)   Pulse 86   Ht 5' 4" (1.626 m)   Wt 85.1 kg (187 lb 9.8 oz)   SpO2 98%   BMI 32.20 kg/m²      Physical Exam    General: No acute distress. Well-developed. Well-nourished.  Eyes: EOMI. Sclerae anicteric.  HENT: Normocephalic. Atraumatic. Nares patent. Moist oral mucosa.  Ears: Bilateral TMs clear.   Cardiovascular: Regular rate. Regular rhythm. No murmurs. No rubs. No gallops. Normal S1, S2.  Respiratory: Normal respiratory effort. Clear to auscultation bilaterally. No rales. No rhonchi. No wheezing.  Abdomen: Soft. Non-tender. Non-distended.   Musculoskeletal: No  obvious deformity.  Extremities: No lower extremity edema.  Neurological: Alert. No slurred speech. Normal gait.  Psychiatric: Normal mood. Normal affect. Good insight. Good judgment.  Skin: Warm. Dry. No rash.           Assessment:       1. Encounter for annual physical exam    2. Arthralgia, unspecified joint    3. Type 2 diabetes mellitus without complication, without long-term current use of insulin    4. Primary hypertension    5. Hypercholesteremia    6. Erectile dysfunction due to arterial insufficiency                 Plan:         1. Encounter for annual physical exam  -     Comprehensive Metabolic Panel; Future; Expected date: " 04/28/2025  -     CBC Auto Differential; Future; Expected date: 04/28/2025  -     Hemoglobin A1C; Future; Expected date: 04/28/2025  -     Hepatitis C Antibody; Future; Expected date: 04/28/2025  -     HIV 1/2 Ag/Ab (4th Gen); Future; Expected date: 04/28/2025  -     pneumoc 20-concha conj-dip cr(PF) (PREVNAR-20 (PF)) injection Syrg 0.5 mL    2. Arthralgia, unspecified joint  Assessment & Plan:  He currently experience joint pain affecting fingers elbows shoulders knees hips and back  Rates pain anywhere from 6-7 in intensity  Reports of morning stiffness at lasts less than 30 minutes and improves with activity  He attends chiropractic sessions weekly which helps  At this time pain is not debilitating and he is able to get about with his daily activities  Continue to monitor    Orders:  -     Sedimentation rate; Future; Expected date: 04/28/2025  -     Uric Acid; Future; Expected date: 04/28/2025    3. Type 2 diabetes mellitus without complication, without long-term current use of insulin  Assessment & Plan:  Chronic, stable  Last A1c 7.2% in care everywhere  Metformin discontinued in the past due to diarrhea  Lipid panel recently done with employee health:  LDL 84 (goal less than 100)  Continue home medications glimepiride 1 mg in a.m.  Continue pravastatin 40 mg and Zetia 10 mg for atherosclerotic risk reduction  Eye exam in October  Foot exam next visit  Microalbumin creatinine ratio today    Orders:  -     Hemoglobin A1C; Future; Expected date: 04/28/2025  -     Microalbumin/Creatinine Ratio, Urine; Future; Expected date: 04/28/2025  -     glimepiride (AMARYL) 1 MG tablet; Take 1 tablet (1 mg total) by mouth before breakfast.  Dispense: 90 tablet; Refill: 3    4. Primary hypertension  Assessment & Plan:  Chronic, well controlled at home  Blood pressure ranges anywhere 120s to 130s/80s to 90s  This nipple discontinued due to cough  Continue home medication hydralazine 100 mg twice daily  Keep a blood pressure  log  Return in 3 months    Orders:  -     hydrALAZINE (APRESOLINE) 100 MG tablet; Take 1 tablet (100 mg total) by mouth 2 (two) times a day.  Dispense: 180 tablet; Refill: 3    5. Hypercholesteremia  Assessment & Plan:  Chronic, stable  Last LDL 84 with a goal  Continue home medication pravastatin 40 mg nightly and Zetia 10 mg nightly      6. Erectile dysfunction due to arterial insufficiency  Assessment & Plan:  History of Peyronie's disease  Following with urology  Recommended pentoxifylline:  Did not help:  Not taking anymore  Currently taking sildenafil for erectile dysfunction  Schedule an appointment with Urology to discuss other options          Health Maintenance     Health Maintenance Due   Topic    Diabetic Eye Exam         Follow up   Follow up in about 3 months (around 7/28/2025).      This note was generated with the assistance of ambient listening technology. Verbal consent was obtained by the patient and accompanying visitor(s) for the recording of patient appointment to facilitate this note. I attest to having reviewed and edited the generated note for accuracy, though some syntax or spelling errors may persist. Please contact the author of this note for any clarification.           Angelina Curran MD  [address] [place] [address]  Ph: [phone]

## 2025-04-29 ENCOUNTER — RESULTS FOLLOW-UP (OUTPATIENT)
Dept: INTERNAL MEDICINE | Facility: CLINIC | Age: 55
End: 2025-04-29

## 2025-04-29 PROBLEM — M25.50 ARTHRALGIA: Status: ACTIVE | Noted: 2025-04-29

## 2025-04-29 PROBLEM — I10 PRIMARY HYPERTENSION: Status: ACTIVE | Noted: 2025-04-29

## 2025-04-29 LAB
ABSOLUTE EOSINOPHIL (OHS): 0.12 K/UL
ABSOLUTE MONOCYTE (OHS): 0.49 K/UL (ref 0.3–1)
ABSOLUTE NEUTROPHIL COUNT (OHS): 4.1 K/UL (ref 1.8–7.7)
ALBUMIN SERPL BCP-MCNC: 3.8 G/DL (ref 3.5–5.2)
ALBUMIN/CREAT UR: 4.6 UG/MG
ALP SERPL-CCNC: 65 UNIT/L (ref 40–150)
ALT SERPL W/O P-5'-P-CCNC: 43 UNIT/L (ref 10–44)
ANION GAP (OHS): 10 MMOL/L (ref 8–16)
AST SERPL-CCNC: 24 UNIT/L (ref 11–45)
BASOPHILS # BLD AUTO: 0.02 K/UL
BASOPHILS NFR BLD AUTO: 0.2 %
BILIRUB SERPL-MCNC: 0.6 MG/DL (ref 0.1–1)
BUN SERPL-MCNC: 17 MG/DL (ref 6–20)
CALCIUM SERPL-MCNC: 9.2 MG/DL (ref 8.7–10.5)
CHLORIDE SERPL-SCNC: 105 MMOL/L (ref 95–110)
CO2 SERPL-SCNC: 26 MMOL/L (ref 23–29)
CREAT SERPL-MCNC: 1.4 MG/DL (ref 0.5–1.4)
CREAT UR-MCNC: 195 MG/DL (ref 23–375)
ERYTHROCYTE [DISTWIDTH] IN BLOOD BY AUTOMATED COUNT: 13.2 % (ref 11.5–14.5)
ERYTHROCYTE [SEDIMENTATION RATE] IN BLOOD BY PHOTOMETRIC METHOD: 10 MM/HR
GFR SERPLBLD CREATININE-BSD FMLA CKD-EPI: 59 ML/MIN/1.73/M2
GLUCOSE SERPL-MCNC: 168 MG/DL (ref 70–110)
HCT VFR BLD AUTO: 48.3 % (ref 40–54)
HGB BLD-MCNC: 15.5 GM/DL (ref 14–18)
IMM GRANULOCYTES # BLD AUTO: 0.01 K/UL (ref 0–0.04)
IMM GRANULOCYTES NFR BLD AUTO: 0.1 % (ref 0–0.5)
LYMPHOCYTES # BLD AUTO: 3.81 K/UL (ref 1–4.8)
MCH RBC QN AUTO: 29.8 PG (ref 27–31)
MCHC RBC AUTO-ENTMCNC: 32.1 G/DL (ref 32–36)
MCV RBC AUTO: 93 FL (ref 82–98)
MICROALBUMIN UR-MCNC: 9 UG/ML (ref ?–5000)
NUCLEATED RBC (/100WBC) (OHS): 0 /100 WBC
PLATELET # BLD AUTO: 187 K/UL (ref 150–450)
PMV BLD AUTO: 12 FL (ref 9.2–12.9)
POTASSIUM SERPL-SCNC: 4.4 MMOL/L (ref 3.5–5.1)
PROT SERPL-MCNC: 7.3 GM/DL (ref 6–8.4)
RBC # BLD AUTO: 5.2 M/UL (ref 4.6–6.2)
RELATIVE EOSINOPHIL (OHS): 1.4 %
RELATIVE LYMPHOCYTE (OHS): 44.6 % (ref 18–48)
RELATIVE MONOCYTE (OHS): 5.7 % (ref 4–15)
RELATIVE NEUTROPHIL (OHS): 48 % (ref 38–73)
SODIUM SERPL-SCNC: 141 MMOL/L (ref 136–145)
URATE SERPL-MCNC: 6.5 MG/DL (ref 3.4–7)
WBC # BLD AUTO: 8.55 K/UL (ref 3.9–12.7)

## 2025-04-29 NOTE — ASSESSMENT & PLAN NOTE
He currently experience joint pain affecting fingers elbows shoulders knees hips and back  Rates pain anywhere from 6-7 in intensity  Reports of morning stiffness at lasts less than 30 minutes and improves with activity  He attends chiropractic sessions weekly which helps  At this time pain is not debilitating and he is able to get about with his daily activities  Continue to monitor

## 2025-04-29 NOTE — ASSESSMENT & PLAN NOTE
Chronic, stable  Last A1c 7.2% in care everywhere  Metformin discontinued in the past due to diarrhea  Lipid panel recently done with employee health:  LDL 84 (goal less than 100)  Continue home medications glimepiride 1 mg in a.m.  Continue pravastatin 40 mg and Zetia 10 mg for atherosclerotic risk reduction  Eye exam in October  Foot exam next visit  Microalbumin creatinine ratio today

## 2025-04-29 NOTE — ASSESSMENT & PLAN NOTE
Chronic, well controlled at home  Blood pressure ranges anywhere 120s to 130s/80s to 90s  This nipple discontinued due to cough  Continue home medication hydralazine 100 mg twice daily  Keep a blood pressure log  Return in 3 months

## 2025-04-29 NOTE — ASSESSMENT & PLAN NOTE
History of Peyronie's disease  Following with urology  Recommended pentoxifylline:  Did not help:  Not taking anymore  Currently taking sildenafil for erectile dysfunction  Schedule an appointment with Urology to discuss other options

## 2025-04-30 DIAGNOSIS — E11.9 TYPE 2 DIABETES MELLITUS WITHOUT COMPLICATION, UNSPECIFIED WHETHER LONG TERM INSULIN USE: ICD-10-CM

## 2025-07-15 ENCOUNTER — PATIENT OUTREACH (OUTPATIENT)
Dept: ADMINISTRATIVE | Facility: HOSPITAL | Age: 55
End: 2025-07-15
Payer: COMMERCIAL

## 2025-07-15 NOTE — PROGRESS NOTES
Chart reviewed and updated. Reconciled immunizations, health maintenance and care everywhere.  Scanned colonoscopy and pathology report from 10.19.21.      Stefani Chakraborty [facility]  Panel Care Coordinator  [facility]  [phone]  For Angelina Curran MD

## 2025-07-25 ENCOUNTER — TELEPHONE (OUTPATIENT)
Dept: INTERNAL MEDICINE | Facility: CLINIC | Age: 55
End: 2025-07-25
Payer: COMMERCIAL

## 2025-07-28 ENCOUNTER — OFFICE VISIT (OUTPATIENT)
Dept: INTERNAL MEDICINE | Facility: CLINIC | Age: 55
End: 2025-07-28
Payer: COMMERCIAL

## 2025-07-28 VITALS
OXYGEN SATURATION: 94 % | DIASTOLIC BLOOD PRESSURE: 90 MMHG | BODY MASS INDEX: 32.61 KG/M2 | SYSTOLIC BLOOD PRESSURE: 130 MMHG | WEIGHT: 191 LBS | HEIGHT: 64 IN | HEART RATE: 88 BPM

## 2025-07-28 DIAGNOSIS — E11.9 TYPE 2 DIABETES MELLITUS WITHOUT COMPLICATION, WITHOUT LONG-TERM CURRENT USE OF INSULIN: ICD-10-CM

## 2025-07-28 DIAGNOSIS — I10 PRIMARY HYPERTENSION: ICD-10-CM

## 2025-07-28 DIAGNOSIS — E66.811 CLASS 1 OBESITY WITH SERIOUS COMORBIDITY AND BODY MASS INDEX (BMI) OF 32.0 TO 32.9 IN ADULT, UNSPECIFIED OBESITY TYPE: ICD-10-CM

## 2025-07-28 DIAGNOSIS — M79.601 RIGHT ARM PAIN: Primary | ICD-10-CM

## 2025-07-28 PROCEDURE — 3051F HG A1C>EQUAL 7.0%<8.0%: CPT | Mod: CPTII,S$GLB,, | Performed by: STUDENT IN AN ORGANIZED HEALTH CARE EDUCATION/TRAINING PROGRAM

## 2025-07-28 PROCEDURE — 3075F SYST BP GE 130 - 139MM HG: CPT | Mod: CPTII,S$GLB,, | Performed by: STUDENT IN AN ORGANIZED HEALTH CARE EDUCATION/TRAINING PROGRAM

## 2025-07-28 PROCEDURE — 99214 OFFICE O/P EST MOD 30 MIN: CPT | Mod: S$GLB,,, | Performed by: STUDENT IN AN ORGANIZED HEALTH CARE EDUCATION/TRAINING PROGRAM

## 2025-07-28 PROCEDURE — 3061F NEG MICROALBUMINURIA REV: CPT | Mod: CPTII,S$GLB,, | Performed by: STUDENT IN AN ORGANIZED HEALTH CARE EDUCATION/TRAINING PROGRAM

## 2025-07-28 PROCEDURE — 3080F DIAST BP >= 90 MM HG: CPT | Mod: CPTII,S$GLB,, | Performed by: STUDENT IN AN ORGANIZED HEALTH CARE EDUCATION/TRAINING PROGRAM

## 2025-07-28 PROCEDURE — 1159F MED LIST DOCD IN RCRD: CPT | Mod: CPTII,S$GLB,, | Performed by: STUDENT IN AN ORGANIZED HEALTH CARE EDUCATION/TRAINING PROGRAM

## 2025-07-28 PROCEDURE — 3066F NEPHROPATHY DOC TX: CPT | Mod: CPTII,S$GLB,, | Performed by: STUDENT IN AN ORGANIZED HEALTH CARE EDUCATION/TRAINING PROGRAM

## 2025-07-28 PROCEDURE — 99999 PR PBB SHADOW E&M-EST. PATIENT-LVL IV: CPT | Mod: PBBFAC,,, | Performed by: STUDENT IN AN ORGANIZED HEALTH CARE EDUCATION/TRAINING PROGRAM

## 2025-07-28 PROCEDURE — G2211 COMPLEX E/M VISIT ADD ON: HCPCS | Mod: S$GLB,,, | Performed by: STUDENT IN AN ORGANIZED HEALTH CARE EDUCATION/TRAINING PROGRAM

## 2025-07-28 PROCEDURE — 3008F BODY MASS INDEX DOCD: CPT | Mod: CPTII,S$GLB,, | Performed by: STUDENT IN AN ORGANIZED HEALTH CARE EDUCATION/TRAINING PROGRAM

## 2025-07-28 RX ORDER — TIRZEPATIDE 2.5 MG/.5ML
2.5 INJECTION, SOLUTION SUBCUTANEOUS
Qty: 2 ML | Refills: 2 | Status: SHIPPED | OUTPATIENT
Start: 2025-07-28

## 2025-07-28 NOTE — ASSESSMENT & PLAN NOTE
140/90 improved from last visit  Previously blood pressures are well controlled at home however he has not kept a blood pressure log for the past 3 months  Lisinopril discontinued due to cough  Continue home medication hydralazine 100 mg twice daily  Keep a blood pressure log and report on portal in 2 weeks  Return in 3 months

## 2025-07-29 PROBLEM — E66.811 CLASS 1 OBESITY WITH SERIOUS COMORBIDITY AND BODY MASS INDEX (BMI) OF 32.0 TO 32.9 IN ADULT: Status: ACTIVE | Noted: 2025-07-29

## 2025-07-29 NOTE — ASSESSMENT & PLAN NOTE
Complicated by type 2 diabetes mellitus  Discuss permanent lifestyle changes  Aerobic exercise 150 minutes per week.  Avoid soda, simple sugars, sweets, excessive rice, pasta, potatoes or bread.   Choose brown options when available and portion control.  Limit fast foods and fried foods and processed food.   Choose complex carbs in moderation (ex: green leafy vegetables, starchy vegetables beans, oatmeal).  Eat plenty of fresh fruits and vegetables with lean meats like chicken or fish daily.    Talked about starting Mounjaro for diabetes which will also help with weight loss

## 2025-07-29 NOTE — PROGRESS NOTES
Patient ID: Joe Avila is a 55 y.o. male.  Chief Complaint: Establish Care and Annual Exam    Subjective:   History of Present Illness    CHIEF COMPLAINT:  Patient presents today with arm pain and muscle weakness.    MUSCULOSKELETAL - UPPER EXTREMITY:  He reports ongoing arm pain with movement and turning, describing pain throughout the muscle. He experiences progressive loss of strength in his hand and cannot use his muscle as previously. He has stopped weight lifting due to pain. He manages symptoms with ibuprofen every other day and stretching exercises (placing arm behind back and against wall) which provide mild relief.  He feels like stretching exercises is improving the pain did    MUSCULOSKELETAL - JOINT PAIN:  He reports chronic joint pain consistent with arthritis, particularly affecting hip and knee joints. Pain and stiffness occur with prolonged sitting and are most pronounced upon waking, gradually easing over time. He denies joint swelling.    HYPERTENSION:  He has a history of hypertension Previously tried  lisinopril, with lisinopril causing significant coughing. He experienced one episode of severe hypotension causing marked weakness and difficulty mobilizing from bed.  Currently taking hydralazine 100 mg twice daily.    DIET AND WEIGHT MANAGEMENT:  He is actively working on weight management through dietary modifications, including avoiding fried and processed foods.    FAMILY HISTORY:  Mother has history of breast cancer. Father had myocardial infarction at age 60-63.      ROS:  General: -fever, -chills, -fatigue, -weight gain, -weight loss  Eyes: -vision changes, -redness, -discharge  ENT: -ear pain, -nasal congestion, -sore throat  Cardiovascular: -chest pain, -palpitations, -lower extremity edema, +orthostatic symptoms  Respiratory: -cough, -shortness of breath  Gastrointestinal: -abdominal pain, -nausea, -vomiting, -diarrhea, -constipation, -blood in stool  Genitourinary: -dysuria,  "-hematuria, -frequency  Musculoskeletal: +joint pain, +muscle pain, +limb pain, +muscle weakness, +pain with movement, +joint stiffness  Skin: -rash, -lesion  Neurological: -headache, -dizziness, -numbness, -tingling  Psychiatric: -anxiety, -depression, -sleep difficulty              Labs Reviewed  Objective:   BP (!) 130/90 (Patient Position: Sitting)   Pulse 88   Ht 5' 4" (1.626 m)   Wt 86.7 kg (191 lb 0.5 oz)   SpO2 (!) 94%   BMI 32.79 kg/m²      Physical Exam    Vitals: Blood pressure elevated at 140/90. Hypertensive.  General: No acute distress. Well-developed. Well-nourished.  Eyes: EOMI. Sclerae anicteric.  HENT: Normocephalic. Atraumatic.   Ears: Bilateral external ear normal.  Cardiovascular: Regular rate. Regular rhythm. No murmurs. No rubs. No gallops. Normal S1, S2.  Respiratory: Normal respiratory effort. Clear to auscultation bilaterally. No rales. No rhonchi. No wheezing.  Musculoskeletal: No  obvious deformity.  Upper extremity: Strength and  good bilaterally  Extremities: No lower extremity edema.  Neurological: Alert .No slurred speech. Normal gait.  Psychiatric: Normal mood. Normal affect. Good insight. Good judgment.  Skin: Warm. Dry. No rash. Cracks/lines noted on bilateral feet. Abnormal appearance of toenail.     Diabetic foot exam     Dorsalis pedis pulses are 2+ on the right side and 2+ on the left side.      Right foot: No deformity.      Left foot: No deformity.       Right foot:      Protective Sensation: 5 sites tested.  5 sites sensed.      Skin integrity: No ulcer or callus.      Left foot:      Protective Sensation: 5 sites tested.  5 sites sensed.      Skin integrity: No ulcer or callus.            Assessment:       1. Right arm pain    2. Type 2 diabetes mellitus without complication, without long-term current use of insulin    3. Primary hypertension    4. Class 1 obesity with serious comorbidity and body mass index (BMI) of 32.0 to 32.9 in adult, unspecified obesity type  "            Plan:         1. Right arm pain  Chronic ongoing for many months now  He reports of pain and right forearm on flexing and extending his wrist  ?  Tendon inflammation  Okay to try NSAIDs:  Help some  No weakness or sensory deficit noted on exam  We will refer to physical therapy  -     Ambulatory Referral/Consult to Physical Therapy    2. Type 2 diabetes mellitus without complication, without long-term current use of insulin  Assessment & Plan:  Chronic, stable  Last A1c 7 % (April 2025)  Metformin discontinued in the past due to diarrhea  Lipid panel recently done with employee health:  LDL 84 (goal less than 100)  Continue home medications glimepiride 1 mg in a.m.  Add Mounjaro 2.5 mg weekly to help with weight loss:  Discussed side effects like nausea, vomiting, constipation, diarrhea abdominal pain.   Discuss contraindications like family history of thyroid cancer, pancreatitis.  Continue pravastatin 40 mg and Zetia 10 mg for atherosclerotic risk reduction (LDL less than 100)  Eye exam in October  Foot exam:  Completed today normal  Microalbumin creatinine ratio normal April 2025    Orders:  -     Hemoglobin A1C; Future; Expected date: 07/28/2025  -     tirzepatide (MOUNJARO) 2.5 mg/0.5 mL PnIj; Inject 2.5 mg into the skin every 7 days.  Dispense: 2 mL; Refill: 2    3. Primary hypertension  Assessment & Plan:  140/90 improved from last visit  Previously blood pressures are well controlled at home however he has not kept a blood pressure log for the past 3 months  Lisinopril discontinued due to cough  Continue home medication hydralazine 100 mg twice daily  Keep a blood pressure log and report on portal in 2 weeks  Return in 3 months      4. Class 1 obesity with serious comorbidity and BMI 32.0-32.9 adult, unspecified obesity type  Complicated by type 2 diabetes mellitus  Discuss permanent lifestyle changes  Aerobic exercise 150 minutes per week.  Avoid soda, simple sugars, sweets, excessive rice,  pasta, potatoes or bread.   Choose brown options when available and portion control.  Limit fast foods and fried foods and processed food.   Choose complex carbs in moderation (ex: green leafy vegetables, starchy vegetables beans, oatmeal).  Eat plenty of fresh fruits and vegetables with lean meats like chicken or fish daily.    Talked about starting Mounjaro for diabetes which will also help with weight loss      Health Maintenance     Health Maintenance Due   Topic    Diabetic Eye Exam         Follow up   Follow up in about 3 months (around 10/28/2025) for Follow up.      This note was generated with the assistance of ambient listening technology. Verbal consent was obtained by the patient and accompanying visitor(s) for the recording of patient appointment to facilitate this note. I attest to having reviewed and edited the generated note for accuracy, though some syntax or spelling errors may persist. Please contact the author of this note for any clarification.           Angelina Curran MD  [address] [place] [address]  Ph: [phone]

## 2025-07-29 NOTE — ASSESSMENT & PLAN NOTE
Chronic, stable  Last A1c 7 % (April 2025)  Metformin discontinued in the past due to diarrhea  Lipid panel recently done with employee health:  LDL 84 (goal less than 100)  Continue home medications glimepiride 1 mg in a.m.  Add Mounjaro 2.5 mg weekly to help with weight loss:  Discussed side effects like nausea, vomiting, constipation, diarrhea abdominal pain.   Discuss contraindications like family history of thyroid cancer, pancreatitis.  Continue pravastatin 40 mg and Zetia 10 mg for atherosclerotic risk reduction (LDL less than 100)  Eye exam in October  Foot exam:  Completed today normal  Microalbumin creatinine ratio normal April 2025

## 2025-08-01 ENCOUNTER — LAB VISIT (OUTPATIENT)
Dept: LAB | Facility: HOSPITAL | Age: 55
End: 2025-08-01
Attending: STUDENT IN AN ORGANIZED HEALTH CARE EDUCATION/TRAINING PROGRAM
Payer: COMMERCIAL

## 2025-08-01 DIAGNOSIS — E11.9 TYPE 2 DIABETES MELLITUS WITHOUT COMPLICATION, WITHOUT LONG-TERM CURRENT USE OF INSULIN: ICD-10-CM

## 2025-08-01 LAB
EAG (OHS): 157 MG/DL (ref 68–131)
HBA1C MFR BLD: 7.1 % (ref 4–5.6)

## 2025-08-01 PROCEDURE — 83036 HEMOGLOBIN GLYCOSYLATED A1C: CPT

## 2025-08-01 PROCEDURE — 36415 COLL VENOUS BLD VENIPUNCTURE: CPT

## 2025-08-15 ENCOUNTER — PATIENT MESSAGE (OUTPATIENT)
Dept: INTERNAL MEDICINE | Facility: CLINIC | Age: 55
End: 2025-08-15
Payer: COMMERCIAL